# Patient Record
Sex: FEMALE
[De-identification: names, ages, dates, MRNs, and addresses within clinical notes are randomized per-mention and may not be internally consistent; named-entity substitution may affect disease eponyms.]

---

## 2020-05-04 ENCOUNTER — HOSPITAL ENCOUNTER (EMERGENCY)
Dept: HOSPITAL 90 - EDH | Age: 45
Discharge: HOME | End: 2020-05-04
Payer: COMMERCIAL

## 2020-05-04 DIAGNOSIS — F31.9: ICD-10-CM

## 2020-05-04 DIAGNOSIS — F29: Primary | ICD-10-CM

## 2020-05-04 LAB
ALBUMIN SERPL-MCNC: 4.3 G/DL (ref 3.5–5)
ALT SERPL-CCNC: 23 U/L (ref 12–78)
AMPHETAMINES UR QL SCN: NEGATIVE
APAP SERPL-MCNC: < 1 MCG/ML (ref 10–30)
AST SERPL-CCNC: 21 U/L (ref 10–37)
BARBITURATES UR QL SCN: NEGATIVE
BASOPHILS NFR BLD AUTO: 0.9 % (ref 0–5)
BENZODIAZ UR QL SCN: NEGATIVE
BILIRUB SERPL-MCNC: 0.7 MG/DL (ref 0.2–1)
BUN SERPL-MCNC: 18 MG/DL (ref 7–18)
BZE UR QL SCN: NEGATIVE
CANNABINOIDS UR QL SCN: NEGATIVE
CHLORIDE SERPL-SCNC: 102 MMOL/L (ref 101–111)
CO2 SERPL-SCNC: 20 MMOL/L (ref 21–32)
CREAT SERPL-MCNC: 0.8 MG/DL (ref 0.5–1.5)
EOSINOPHIL NFR BLD AUTO: 0.8 % (ref 0–8)
ERYTHROCYTE [DISTWIDTH] IN BLOOD BY AUTOMATED COUNT: 12 % (ref 11–15.5)
ETHANOL SERPL-MCNC: < 3 MG/DL (ref 0–10)
GFR SERPL CREATININE-BSD FRML MDRD: 83 ML/MIN (ref 60–?)
GLUCOSE SERPL-MCNC: 87 MG/DL (ref 70–105)
HCG UR QL: NEGATIVE
HCT VFR BLD AUTO: 37.9 % (ref 36–48)
KETONES UR STRIP-MCNC: 40 MG/DL
LYMPHOCYTES NFR SPEC AUTO: 25.3 % (ref 21–51)
MCH RBC QN AUTO: 29.7 PG (ref 27–33)
MCHC RBC AUTO-ENTMCNC: 33.5 G/DL (ref 32–36)
MCV RBC AUTO: 88.8 FL (ref 79–99)
MONOCYTES NFR BLD AUTO: 6.3 % (ref 3–13)
NEUTROPHILS NFR BLD AUTO: 66.6 % (ref 40–77)
NRBC BLD MANUAL-RTO: 0 % (ref 0–0.19)
OPIATES UR QL SCN: NEGATIVE
PCP UR QL SCN: NEGATIVE
PH UR STRIP: 6 [PH] (ref 5–8)
PLATELET # BLD AUTO: 255 K/UL (ref 130–400)
POTASSIUM SERPL-SCNC: 3 MMOL/L (ref 3.5–5.1)
PROT SERPL-MCNC: 8 G/DL (ref 6–8.3)
RBC # BLD AUTO: 4.27 MIL/UL (ref 4–5.5)
RBC #/AREA URNS HPF: (no result) /HPF (ref 0–1)
SALICYLATES SERPL-MCNC: < 2.8 MG/DL (ref 2.8–20)
SODIUM SERPL-SCNC: 136 MMOL/L (ref 136–145)
SP GR UR STRIP: 1.03 (ref 1–1.03)
UROBILINOGEN UR STRIP-MCNC: 1 MG/DL (ref 0.2–1)
WBC # BLD AUTO: 7.4 K/UL (ref 4.8–10.8)
WBC #/AREA URNS HPF: (no result) /HPF (ref 0–1)

## 2020-05-04 PROCEDURE — 80053 COMPREHEN METABOLIC PANEL: CPT

## 2020-05-04 PROCEDURE — 81025 URINE PREGNANCY TEST: CPT

## 2020-05-04 PROCEDURE — 85025 COMPLETE CBC W/AUTO DIFF WBC: CPT

## 2020-05-04 PROCEDURE — 82550 ASSAY OF CK (CPK): CPT

## 2020-05-04 PROCEDURE — 87077 CULTURE AEROBIC IDENTIFY: CPT

## 2020-05-04 PROCEDURE — 80305 DRUG TEST PRSMV DIR OPT OBS: CPT

## 2020-05-04 PROCEDURE — 93005 ELECTROCARDIOGRAM TRACING: CPT

## 2020-05-04 PROCEDURE — 87186 SC STD MICRODIL/AGAR DIL: CPT

## 2020-05-04 PROCEDURE — 36415 COLL VENOUS BLD VENIPUNCTURE: CPT

## 2020-05-04 PROCEDURE — 99285 EMERGENCY DEPT VISIT HI MDM: CPT

## 2020-05-04 PROCEDURE — 96372 THER/PROPH/DIAG INJ SC/IM: CPT

## 2020-05-04 PROCEDURE — 81001 URINALYSIS AUTO W/SCOPE: CPT

## 2020-05-04 PROCEDURE — 87088 URINE BACTERIA CULTURE: CPT

## 2025-02-09 ENCOUNTER — HOSPITAL ENCOUNTER (EMERGENCY)
Dept: HOSPITAL 90 - EDH | Age: 50
Discharge: LEFT BEFORE DISCHARGE | End: 2025-02-09
Payer: COMMERCIAL

## 2025-02-09 VITALS
RESPIRATION RATE: 20 BRPM | HEART RATE: 128 BPM | DIASTOLIC BLOOD PRESSURE: 89 MMHG | SYSTOLIC BLOOD PRESSURE: 131 MMHG | TEMPERATURE: 98.5 F

## 2025-02-09 VITALS — WEIGHT: 139.99 LBS | HEIGHT: 63 IN | BODY MASS INDEX: 24.8 KG/M2

## 2025-02-09 DIAGNOSIS — Z53.21: ICD-10-CM

## 2025-02-09 DIAGNOSIS — R03.0: Primary | ICD-10-CM

## 2025-02-27 ENCOUNTER — HOSPITAL ENCOUNTER (EMERGENCY)
Dept: HOSPITAL 90 - EDH | Age: 50
Discharge: HOME | End: 2025-02-27
Payer: COMMERCIAL

## 2025-02-27 VITALS — DIASTOLIC BLOOD PRESSURE: 60 MMHG | SYSTOLIC BLOOD PRESSURE: 120 MMHG

## 2025-02-27 VITALS — HEIGHT: 63 IN | BODY MASS INDEX: 23.55 KG/M2 | WEIGHT: 132.94 LBS

## 2025-02-27 VITALS — RESPIRATION RATE: 16 BRPM | TEMPERATURE: 98.3 F | OXYGEN SATURATION: 98 % | HEART RATE: 80 BPM

## 2025-02-27 DIAGNOSIS — S30.0XXA: Primary | ICD-10-CM
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
## 2025-02-27 DIAGNOSIS — Z98.51: ICD-10-CM

## 2025-02-27 LAB
BASOPHILS # BLD AUTO: 0.06 K/UL (ref 0–0.2)
BASOPHILS NFR BLD AUTO: 0.8 % (ref 0–5)
BUN SERPL-MCNC: 8 MG/DL (ref 7–18)
CHLORIDE SERPL-SCNC: 105 MMOL/L (ref 101–111)
CO2 SERPL-SCNC: 31 MMOL/L (ref 21–32)
CREAT SERPL-MCNC: 0.6 MG/DL (ref 0.5–1)
EOSINOPHIL # BLD AUTO: 0.12 K/UL (ref 0–0.7)
EOSINOPHIL NFR BLD AUTO: 1.6 % (ref 0–8)
ERYTHROCYTE [DISTWIDTH] IN BLOOD BY AUTOMATED COUNT: 12.1 % (ref 11–15.5)
GFR SERPL CREATININE-BSD FRML MDRD: 111 ML/MIN (ref 90–?)
GLUCOSE SERPL-MCNC: 93 MG/DL (ref 70–105)
HCT VFR BLD AUTO: 40.1 % (ref 36–48)
IMM GRANULOCYTES # BLD: 0.01 K/UL (ref 0–1)
LYMPHOCYTES # SPEC AUTO: 3 K/UL (ref 1–4.8)
LYMPHOCYTES NFR SPEC AUTO: 40 % (ref 21–51)
MCH RBC QN AUTO: 31.2 PG (ref 27–33)
MCHC RBC AUTO-ENTMCNC: 34.7 G/DL (ref 32–36)
MCV RBC AUTO: 89.9 FL (ref 79–99)
MONOCYTES # BLD AUTO: 0.5 K/UL (ref 0.1–1)
MONOCYTES NFR BLD AUTO: 6.5 % (ref 3–13)
NEUTROPHILS # BLD AUTO: 3.8 K/UL (ref 1.8–7.7)
NEUTROPHILS NFR BLD AUTO: 51 % (ref 40–77)
NRBC BLD MANUAL-RTO: 0 % (ref 0–0.19)
PLATELET # BLD AUTO: 262 K/UL (ref 130–400)
POTASSIUM SERPL-SCNC: 3.9 MMOL/L (ref 3.5–5.1)
RBC # BLD AUTO: 4.46 MIL/UL (ref 4–5.5)
SODIUM SERPL-SCNC: 139 MMOL/L (ref 136–145)
WBC # BLD AUTO: 7.5 K/UL (ref 4.8–10.8)

## 2025-02-27 PROCEDURE — 99284 EMERGENCY DEPT VISIT MOD MDM: CPT

## 2025-02-27 PROCEDURE — 84484 ASSAY OF TROPONIN QUANT: CPT

## 2025-02-27 PROCEDURE — 72070 X-RAY EXAM THORAC SPINE 2VWS: CPT

## 2025-02-27 PROCEDURE — 93005 ELECTROCARDIOGRAM TRACING: CPT

## 2025-02-27 PROCEDURE — 84703 CHORIONIC GONADOTROPIN ASSAY: CPT

## 2025-02-27 PROCEDURE — 73030 X-RAY EXAM OF SHOULDER: CPT

## 2025-02-27 PROCEDURE — 72170 X-RAY EXAM OF PELVIS: CPT

## 2025-02-27 PROCEDURE — 80048 BASIC METABOLIC PNL TOTAL CA: CPT

## 2025-02-27 PROCEDURE — 85025 COMPLETE CBC W/AUTO DIFF WBC: CPT

## 2025-02-27 PROCEDURE — 36415 COLL VENOUS BLD VENIPUNCTURE: CPT

## 2025-02-27 NOTE — EKG
[facility]

                                       

Test Date:    2025               Test Time:    16:30:13

Pat Name:     DAVID BOGGS        Department:   [facility]

Patient ID:   [id number]           Room:          

Gender:       F                        Technician:   [id number]

:          1975               Requested By: CHANTELLE TSAI

Order Number: [id number]        Reading MD:   Shruthi Wang

                                 Measurements

Intervals                              Axis          

Rate:         74                       P:            51

TN:           165                      QRS:          -27

QRSD:         82                       T:            16

QT:           356                                    

QTc:          395                                    

                           Interpretive Statements

Sinus rhythm

Compared to ECG 2020 16:32:16

No significant changes

Electronically Signed On 2025 08:39:49 CST by Shruthi Wang



Please click the below link to view image of tracing.

## 2025-02-27 NOTE — HMCIMG
THORACIC SPINE 2VWS



CLINICAL HISTORY: Left lateral T-spine pain status post fall sat



COMPARISON: None.



TECHNIQUE: AP and lateral images were obtained.



FINDINGS:

There are normal appearing vertebral bodies. Interspace heights are well

preserved. There are no visible fractures. Soft tissues appear

unremarkable.



IMPRESSION:

Normal views of the thoracic spine.

## 2025-02-27 NOTE — HMCIMG
SHOULDER COMP 2+VWS LT



CLINICAL HISTORY: Left posterior shoulder pain status post fall Saturday





COMPARISON: None 



TECHNIQUE: 3 images were obtained.



FINDINGS: No obvious fracture or dislocation. No joint effusion. The

soft tissues appear unremarkable. No radiopaque foreign bodies.



IMPRESSION: No acute findings.

## 2025-02-27 NOTE — HMCIMG
PELVIS 1-2VWS



REASON:  Bilateral posterior pelvic pain status post fall Saturday



TECHNIQUE: Claudia view was obtained.



FINDINGS:

Bones appear normal. There are no visible fractures. Joint spaces are

unremarkable. Soft tissues appear normal as well.





IMPRESSION:

1. Negative AP pelvis.

## 2025-02-27 NOTE — ERN
ED Note


History of Present Illness


Stated Complaint:  MECHANICAL FALL


Time Seen by MD:  16:15


Dictation:


Patient is a 49-year-old female coming in today with a long history of being 

admitted to [facility] this weekend and she was in a wet bathroom it is 

the same the a same level fall.  She states she landed on her back and left 

posterior shoulder.  She states she had told the staff that she had fallen 

however they were busy with the another patient who was more psychiatric 

emergent then she was and could not tend to her at that time.   She denies LOC 

no nausea vomiting no blood thinners states she does history of orthostasis and 

is dizzy apparently she is complaining of posterior pelvic pain more to the 

right, left posterior thoracic left posterior shoulder pain.  She states she was

discharged on Monday however did not think to go and be seen until today when 

she went to [facility] and was told to come to the emergency room.


Allergies:  


Coded Allergies:  


     No Known Drug Allergies (Unverified  Allergy, Unknown, 2/27/25)


Home Meds


Active Scripts


Ibuprofen (Ibuprofen) 600 Mg Tablet, 600 MG PO Q6H PRN for PAIN, #30 TAB


   Prov:CHANTELLE TSAI NP         2/27/25





Past Medical History


Past Medical History:  Other


Additional Past Medical Hx:  RAYNAUDS


Surgical History:  BTL


Pregnancy History:  Not Applicable


RN Note Reviewed/Agreed w/PFSH:  Yes





Review of System


Dictation


CONSTITUTIONAL:  Negative except for HPI


HEAD/FACE:  Negative except for HPI


EENT:  Negative except for HPI


RESPIRATORY: Negative except for HPI


GASTROINTESTINAL/ABDOMINAL:   Negative except for HPI


GENITOURINARY: Negative except for HPI


MUSCULOSKELETAL: Negative except for HPI right posterior pelvis lumbar pain, 

left posterior shoulder and left lateral thoracic pain.


INTEGUMENTARY:  Negative except for HPI


NEUROLOGICAL/PSYCH: Negative except for HPI dizziness


HEMATOLOGIC/LYMPHATIC:  Negative except for HPI





All Systems Negative, Except as noted above.





13 point review of systems assessed and all negative except for above.





Initial Vital Sign


VS





                                   Vital Signs








  Date Time  Temp Pulse Resp B/P (MAP) Pulse Ox O2 Delivery O2 Flow Rate FiO2


 


2/27/25 16:35 97.9 85 16 121/61 97 Room Air  


 


2/27/25 16:49       0 21











Physical Exam


Dictation


Vital Signs reviewed 


General Appearance: Alert, oriented x 3, mild acute distress, well developed, 

nourished. 


Head and Face: non-traumatic.


Eyes: PERRL, pink conjunctivas, eyelid no trauma, anterior chamber with arcus 

senilis. 


Ears: Pinnas intact and no signs of trauma or erythema ear canals clear and no 

discharge TM no erythema 


Nose: No discharge, no bleeding. 


Oropharynx: Mouth normal, tongue pink, 


pharynx clear,no erythema, tonsils no exudates, no abscesses noted,  mucous 

membrane moist 


Neck: Supple, non-tender, no thyromegaly, no masses, no JVD, no bruits no 

midline spine pain


Breast:Deferred 


Chest:No tenderness, no crepitus, no paradoxical movement, no retractions 


Lungs:Clear, well-ventilated, symmetric, no rales, no wheezing, no rhonchi, no 

stridor, good breath sounds bilaterally 


Heart: Regular rate, regular rhythm, no murmur, no gallops 


Vascular: no peripheral edema, 


Abdomen: Soft, positive bowel sounds, nondistended, no guarding, 


nontender, no rebound, no masses no hepatomegaly, no splenomegaly, no Stubbs's 

sign, no hernias.


Rectal: Deferred


Genital: Deferred


Neurological: Normal speech,  motor function intact, sensory function intact 

neurologically intact, speech is clear NIH is 0


Musculoskeletal: Neck nontender, right posterior pelvis and low lumbar 

tenderness.  Also mild tenderness to the posterior left shoulder and left 

lateral thoracic area.


Extremities: nontender, full range of motion 


Skin: Color pink, dry, no turgor, no rash, no lacerations, no abrasions, no 

contusions.


Lymphatic: Deferred





Results (Laboratory/Radiology)


Laboratory/Radiology





Laboratory Tests








Test


 2/27/25


16:47


 


White Blood Count


 7.5 K/uL


(4.8-10.8)


 


Red Blood Count


 4.46 MIL/uL


(4.00-5.50)


 


Hemoglobin


 13.9 g/dL


(12.0-16.0)


 


Hematocrit


 40.1 % (36-48)





 


Mean Corpuscular Volume


 89.9 fL


(79-99)


 


Mean Corpuscular Hemoglobin


 31.2 pg


(27.0-33.0)


 


Mean Corpuscular Hemoglobin


Concent 34.7 g/dL


(32.0-36.0)


 


Red Cell Distribution Width


 12.1 %


(11.0-15.5)


 


Platelet Count


 262 K/uL


(130-400)


 


Mean Platelet Volume


 8.2 fL


(7.5-10.5)


 


Immature Granulocyte % (Auto) 0.1 % (0-1)  


 


Neutrophils (%) (Auto)


 51.0 %


(40.0-77.0)


 


Lymphocytes (%) (Auto)


 40.0 %


(21.0-51.0)


 


Monocytes (%) (Auto)


 6.5 %


(3.0-13.0)


 


Eosinophils (%) (Auto)


 1.6 %


(0.0-8.0)


 


Basophils (%) (Auto)


 0.8 %


(0.0-5.0)


 


Neutrophils # (Auto)


 3.8 K/uL


(1.8-7.7)


 


Lymphocytes # (Auto)


 3.0 K/uL


(1.0-4.8)


 


Monocytes # (Auto)


 0.5 K/uL


(0.1-1.0)


 


Eosinophils # (Auto)


 0.12 K/uL


(0.00-0.70)


 


Basophils # (Auto)


 0.06 K/uL


(0.00-0.20)


 


Absolute Immature Granulocyte


(auto 0.01 K/uL


(0-1)


 


Nucleated Red Blood Cells


 0.0 %


(0.0-0.19)


 


Sodium Level


 139 mmol/L


(136-145)


 


Potassium Level


 3.9 mmol/L


(3.5-5.1)


 


Chloride Level


 105 mmol/L


(101-111)


 


Carbon Dioxide Level


 31 mmol/L


(21-32)


 


Blood Urea Nitrogen


 8 mg/dL (7-18)





 


Creatinine


 0.6 mg/dL


(0.5-1.0)


 


Glomerular Filtration Rate


Calc 111 mL/min


(>90)


 


Random Glucose


 93 mg/dL


()


 


Total Calcium


 8.8 mg/dL


(8.5-10.1)


 


Troponin I High Sensitivity 4 ng/L (4-50)  


 


Serum Pregnancy Test,


Qualitative NEGATIVE


(NEGATIVE)








Labs Reviewed?:  Yes


EKG Comment:


EKG normal sinus rhythm/heart rate 74/axis normal/no ectopy





ED Course


ED Course





Orders








Procedure Category Date Status





  Time 


 


Pelvis 1-2vws RAD 2/27/25 Resulted





  16:24 


 


Shoulder Comp 2+Vws Lt RAD 2/27/25 Resulted





  16:24 


 


Thoracic Spine 2vws RAD 2/27/25 Resulted





  16:24 


 


Cbc With Differential LAB 2/27/25 Complete





  16:24 


 


Troponin I High LAB 2/27/25 Complete





Sensitivity  16:24 


 


12 Lead Ekg Tracing- EKG 2/27/25 Complete





Technical  16:24 


 


Basic Metabolic Panel LAB 2/27/25 Complete





  16:24 


 


Acetaminophen 500mg PHA 2/27/25 Complete





Tab (Tylenol 500mg T  16:30 


 


Pregnancy Testing, LAB 2/27/25 Complete





Serum Hcg  17:01 








Current Medications








 Medications


  (Trade)  Dose


 Ordered  Sig/Greg


 Route


 PRN Reason  Start Time


 Stop Time Status Last Admin


Dose Admin


 


 Acetaminophen


  (TYLenol 500MG


 TAB)  1,000 mg  ONCE  ONCE


 PO


   2/27/25 16:30


 2/27/25 16:40 DC 2/27/25 17:42











Vital Signs








  Date Time  Temp Pulse Resp B/P (MAP) Pulse Ox O2 Delivery O2 Flow Rate FiO2


 


2/27/25 19:41 98.2 80 16  98 Room Air* 0 21


 


2/27/25 16:49 98.1 82 16 120/60 98 Room Air* 0 21


 


2/27/25 16:35 97.9 85 16 121/61 97 Room Air  











1920/pelvic x-ray negative


Left shoulder x-ray negative


Thoracic x-ray negative








1925/patient is hemodynamically stable and neurologically intact.  She will be 

discharged home with multiple contusions and told to follow up with the doctor 

at [facility] for any further complaints.











HEART Score Response (Comments) Value


 


History: Low suspicion (0) 0


 


Age:                                    45-65yrs (+1) 1


 


Risk Factors: No known risk factors (0) 0


 


Initial Troponin: Normal limit (0) 0


 


Total  1











Medical Decision Making


MDM


MDM: 


Differential diagnosis:  ACS/AMI/electrolyte imbalance/dehydration/contusion/fra

cture


Rationale: Tests considered and ordered secondary to shared decision making 

include:  Radiology/labs/EKG


Previous outside records reviewed: Old ER visits.


Risk of complication and/or morbidity or mortality of patient management: None


Medications-Per medication reconciliation none


Need for hospitalization: Patient does not meet criteria for hospitalization.  

No


Need for emergency major/minor surgery: No





There are no social concerns with this patient.





Prescription drug management ibuprofen


Prescriptions will include symptomatic care





Patient's prior external medical records from other ER visits were reviewed by 

me as indicated.  Prior testing and results from previous visits were reviewed. 

Prior tests were taken into account with medical decision making and resource 

utilization, independent historian/historians were used to obtain complete 

medical history.





I independently interpreted the test that were performed, results were reviewed 

by me and considered findings on radiology if ordered.





Medical management and examination interpretation discussions were had by me 

with other qualified healthcare professionals as indicated for the patient's 

care.





DX & DISP


Disposition:  Discharge


Departure


Impression:  


   Primary Impression:  Multiple contusions of trunk


   Additional Impression:  Fall


Condition:  Stable


Scripts


Ibuprofen (Ibuprofen) 600 Mg Tablet


600 MG PO Q6H PRN for PAIN, #30 TAB


   Prov: CHANTELLE TSAI NP         2/27/25





Additional Instructions:  


Follow-up with primary care provider in 1 to 2 days.  Take medications as 

directed here in the emergency room.  Okay to continue home medications unless 

otherwise discussed during your visit in the emergency room today.  Return to 

your nearest emergency room if symptoms worsen or if there is no improvement.  

Call 911 if you need immediate assistance.  Take Tylenol or Motrin 

over-the-counter as needed and if no contraindications are present.  Increase 

oral hydration.  A wound culture or urine culture was ordered here in the em

ergency room department please follow-up with primary care provider and advise 

them to get repeat ports from our facility.  If you had any Ace wrap/splints 

that were applied here, please do not remove them until you see your primary 

care or specialty.





Diet and activity as tolerated.  Take ibuprofen as needed with food for pain.  

Follow back up with your doctor at [facility] for management


Referrals:  


DON MORRIS (PCP)


Time of Disposition:  19:27


I have reviewed the case, and I agree with, Diagnosis and Plan











CHANTELLE TSAI NP              Feb 27, 2025 16:29


ANNEMARIE MEZA DO                Feb 28, 2025 12:13

## 2025-03-01 ENCOUNTER — HOSPITAL ENCOUNTER (EMERGENCY)
Dept: HOSPITAL 90 - EDH | Age: 50
Discharge: HOME | End: 2025-03-01
Payer: COMMERCIAL

## 2025-03-01 VITALS — TEMPERATURE: 98.1 F

## 2025-03-01 VITALS
DIASTOLIC BLOOD PRESSURE: 67 MMHG | HEART RATE: 70 BPM | OXYGEN SATURATION: 96 % | SYSTOLIC BLOOD PRESSURE: 112 MMHG | RESPIRATION RATE: 18 BRPM

## 2025-03-01 VITALS — HEIGHT: 63 IN | WEIGHT: 136.03 LBS | BODY MASS INDEX: 24.1 KG/M2

## 2025-03-01 DIAGNOSIS — Z79.899: ICD-10-CM

## 2025-03-01 DIAGNOSIS — Z98.51: ICD-10-CM

## 2025-03-01 DIAGNOSIS — G44.229: Primary | ICD-10-CM

## 2025-03-01 LAB
APPEARANCE UR: CLEAR
BASOPHILS # BLD AUTO: 0.02 K/UL (ref 0–0.2)
BASOPHILS NFR BLD AUTO: 0.5 % (ref 0–5)
BILIRUB UR QL STRIP: NEGATIVE MG/DL
BUN SERPL-MCNC: 14 MG/DL (ref 7–18)
CHLORIDE SERPL-SCNC: 104 MMOL/L (ref 101–111)
CO2 SERPL-SCNC: 26 MMOL/L (ref 21–32)
COLOR UR: COLORLESS
CREAT SERPL-MCNC: 0.7 MG/DL (ref 0.5–1)
DEPRECATED SQUAMOUS URNS QL MICRO: (no result) /HPF (ref 0–2)
EOSINOPHIL # BLD AUTO: 0.01 K/UL (ref 0–0.7)
EOSINOPHIL NFR BLD AUTO: 0.2 % (ref 0–8)
ERYTHROCYTE [DISTWIDTH] IN BLOOD BY AUTOMATED COUNT: 12 % (ref 11–15.5)
GFR SERPL CREATININE-BSD FRML MDRD: 107 ML/MIN (ref 90–?)
GLUCOSE SERPL-MCNC: 160 MG/DL (ref 70–105)
GLUCOSE UR STRIP-MCNC: NEGATIVE MG/DL
HCG UR QL: NEGATIVE
HCT VFR BLD AUTO: 38.2 % (ref 36–48)
HGB UR QL STRIP: NEGATIVE
IMM GRANULOCYTES # BLD: 0.02 K/UL (ref 0–1)
KETONES UR STRIP-MCNC: NEGATIVE MG/DL
LEUKOCYTE ESTERASE UR QL STRIP: NEGATIVE LEU/UL
LYMPHOCYTES # SPEC AUTO: 1.1 K/UL (ref 1–4.8)
LYMPHOCYTES NFR SPEC AUTO: 26.1 % (ref 21–51)
MCH RBC QN AUTO: 31.2 PG (ref 27–33)
MCHC RBC AUTO-ENTMCNC: 35.1 G/DL (ref 32–36)
MCV RBC AUTO: 88.8 FL (ref 79–99)
MICRO URNS: YES
MONOCYTES # BLD AUTO: 0.1 K/UL (ref 0.1–1)
MONOCYTES NFR BLD AUTO: 1.4 % (ref 3–13)
MUCOUS THREADS URNS QL MICRO: (no result) LPF
NEUTROPHILS # BLD AUTO: 3 K/UL (ref 1.8–7.7)
NEUTROPHILS NFR BLD AUTO: 71.3 % (ref 40–77)
NITRITE UR QL STRIP: NEGATIVE
NRBC BLD MANUAL-RTO: 0 % (ref 0–0.19)
PH UR STRIP: 7 [PH] (ref 5–8)
PLATELET # BLD AUTO: 250 K/UL (ref 130–400)
POTASSIUM SERPL-SCNC: 4.1 MMOL/L (ref 3.5–5.1)
PROT UR QL STRIP: NEGATIVE MG/DL
RBC # BLD AUTO: 4.3 MIL/UL (ref 4–5.5)
SODIUM SERPL-SCNC: 138 MMOL/L (ref 136–145)
SP GR UR STRIP: 1 (ref 1–1.03)
UROBILINOGEN UR STRIP-MCNC: 0.2 MG/DL (ref 0.2–1)
WBC # BLD AUTO: 4.2 K/UL (ref 4.8–10.8)
WBC #/AREA URNS HPF: (no result) /HPF (ref 0–1)

## 2025-03-01 PROCEDURE — 80048 BASIC METABOLIC PNL TOTAL CA: CPT

## 2025-03-01 PROCEDURE — 81001 URINALYSIS AUTO W/SCOPE: CPT

## 2025-03-01 PROCEDURE — 85025 COMPLETE CBC W/AUTO DIFF WBC: CPT

## 2025-03-01 PROCEDURE — 36415 COLL VENOUS BLD VENIPUNCTURE: CPT

## 2025-03-01 PROCEDURE — 81025 URINE PREGNANCY TEST: CPT

## 2025-03-01 PROCEDURE — 99284 EMERGENCY DEPT VISIT MOD MDM: CPT

## 2025-03-01 PROCEDURE — 96361 HYDRATE IV INFUSION ADD-ON: CPT

## 2025-03-01 PROCEDURE — 96374 THER/PROPH/DIAG INJ IV PUSH: CPT

## 2025-03-01 PROCEDURE — 96375 TX/PRO/DX INJ NEW DRUG ADDON: CPT

## 2025-03-01 RX ADMIN — SODIUM CHLORIDE ONE MLS/HR: 0.9 INJECTION, SOLUTION INTRAVENOUS at 06:01

## 2025-03-01 RX ADMIN — PROCHLORPERAZINE EDISYLATE ONE MG: 5 INJECTION INTRAMUSCULAR; INTRAVENOUS at 06:01

## 2025-03-01 NOTE — ERN
General


Chief Complaint:  Headache


Stated Complaint:  HEADACHE


Time Seen by MD:  04:56


Source:  patient





History of Present Illness


Initial Comments


PATIENT IS A 49-YEAR-OLD FEMALE COMING IN TO BE EVALUATED FOR HEADACHE AND 

DIZZINESS.  ALONG WITH THE HEADACHE AND DIZZINESS SHE STATES THAT SHE HAS BEEN 

UNDER LOT OF STRESS AND SHE HAS UPPER BACK PAIN.  PATIENT HAS BEEN EVALUATED IN 

THE PAST FOR UPPER BACK PAIN WITH A IMAGING STUDIES WHICH HAS BEEN NEGATIVE.  

SHE STATES IT WAS ALL STEMMED FROM WITH A SEVEN IN WHICH STATES THAT SHE HAD AN 

ARGUMENT WITH A HAS BEEN DUE TO HIM BEING IN FACIAL.  SINCE THEN SHE HAS BEEN 

UNDER LOT OF STRESS SHE STATES.


Allergies:  


Coded Allergies:  


     No Known Drug Allergies (Unverified  Allergy, Unknown, 2/27/25)


Home Meds


Active Scripts


Ibuprofen (Ibuprofen) 600 Mg Tablet, 600 MG PO Q6H PRN for PAIN, #30 TAB


   Prov:EULALIOCHANTELLE NP         2/27/25





Past Medical History


Past Medical History:  Other


Medical History Other:  RAYNAUDS


Past Surgical History:  BTL





Female(pregnancy History)


Pregnancy History:  Not Applicable





ROS Dictation


CONSTITUTIONAL:  NO CHILLS, NO FEVER, NO WEAKNESS, NO DIAPHORESIS, NO MALAISE.


HEAD/FACE:  NO SIGNS OF TRAUMA. 


EENT:  NO EYE PAIN, NO BLURRED VISION, NO TEARING, NO DOUBLE VISION, NO EAR 

PAIN, NO EAR DISCHARGE, NO NOSE PAIN, NO NASAL CONGESTION, NO THROAT PAIN, NO 

THROAT SWELLING, NO MOUTH PAIN. 


RESPIRATORY:  NO COUGH, NO ORTHOPNEA, NO SOB, NO STRIDOR, NO WHEEZING. 


CARDIOVASCULAR:  NO CHEST PAIN, NO EDEMA, NO PALPITATIONS, NO SYNCOPE. 


GASTROINTESTINAL/ABDOMINAL:   NO ABDOMINAL PAIN, NO CONSTIPATION, NO DIARRHEA, 

NO NAUSEA, NO VOMITING. 


GENITOURINARY:  NO ABNORMAL DISCHARGE, NO DYSURIA, NO FREQUENT URINATION, NO 

HEMATURIA. NO COMPLAINTS OF PAIN IN THE GENITALS. 


MUSCULOSKELETAL:  NO BACK PAIN, NO GOUT, NO JOINT PAIN, NO JOINT SWELLING, NO 

MUSCLE PAIN, NO MUSCLE STIFFNESS, NO NECK PAIN. 


INTEGUMENTARY:  NO CHANGE IN COLOR, NO CHANGE IN HAIR/NAILS, NO DRYNESS, NO 

LESION, NO LUMPS, NO RASH. 


NEUROLOGICAL/PSYCH:  NO ANXIETY, NOT DEPRESSED, NO EMOTIONAL PROBLEM, NO 

HEADACHE, NO NUMBNESS, NO PRE-EXISTING DEFICIT, NO HISTORY OF SEIZURES,  NO 

TREMORS, NO WEAKNESS. 


HEMATOLOGIC/LYMPHATIC:  NOT ANEMIC, NO HISTORY OF BLOOD CLOTS, NO APPARENT 

BLEEDING, NO BRUISING, GLANDS NOT SWOLLEN.


ALL SYSTEMS NEGATIVE, EXCEPT AS NOTED.





Physical Exam


Physical Exam Dictation


VITAL SIGNS:  REVIEWED. 


GENERAL APPEARANCE:  ALERT, ORIENTED X3, NO ACUTE DISTRESS, OBESE.


HEAD AND FACE: NON-TRAUMATIC. 


EYES:   PERRL, PINK CONJUNCTIVAS, EYELID NO TRAUMA, ANTERIOR CHAMBER CLEAR. 


EARS:  PINNAS INTACT AND NO SIGNS OF TRAUMA OR ERYTHEMA.  EAR CANALS CLEAR AND 

NO DISCHARGE. TMS NO ERYTHEMA. 


NOSE:  NO DISCHARGE, NO BLEEDING. 


OROPHARYNX:  MOUTH NORMAL, TEETH NO CARIES, TONGUE PINK.  PHARYNX CLEAR, NO 

ERYTHEMA.  TONSILS NO EXUDATES, NO ABSCESSES NOTED. MUCOUS MEMBRANE MOIST.


NECK:  SUPPLE, NON-TENDER, NO THYROMEGALY, NO MASSES, NO JVD, NO BRUITS. 


BREAST:  DEFERRED.


CHEST:   NO TENDERNESS, NO CREPITUS, NO PARADOXICAL MOVEMENT, NO RETRACTIONS.


LUNGS:  CLEAR, WELL-VENTILATED, SYMMETRIC, NO RALES, NO WHEEZING, NO RHONCHI, NO

STRIDOR, GOOD BREATH SOUNDS BILATERALLY. 


HEART:  REGULAR RATE, REGULAR RHYTHM, NO MURMUR, NO GALLOPS. 


VASCULAR: NO PERIPHERAL EDEMA.


ABDOMEN: SOFT, POSITIVE BOWEL SOUNDS, NONDISTENDED, NO GUARDING, NONTENDER, NO 

REBOUND, NO MASSES NO HEPATOMEGALY, NO SPLENOMEGALY, NO VILLEGAS'S SIGN, NO 

HERNIAS. 


RECTAL: DEFERRED. 


GENITAL:  DEFERRED. 


NEUROLOGICAL:  NORMAL SPEECH, GROSS MOTOR FUNCTION INTACT, GROSS SENSORY 

FUNCTION INTACT.


MUSCULOSKELETAL:  NECK NONTENDER, FULL RANGE OF MOTION, BACK NONTENDER, FULL 

RANGE OF MOTION.


EXTREMITIES: NONTENDER, FULL RANGE OF MOTION. 


SKIN:  COLOR PINK, DRY, NO TURGOR, NO RASH, NO LACERATIONS, NO ABRASIONS, NO 

CONTUSIONS.


LYMPHATICS:  DEFERRED.





Results


Laboratory and Microbiology


Lab and Micro Result





Laboratory Tests








Test


 3/1/25


04:58 3/1/25


05:43


 


Urine Color


 COLORLESS


(YELLOW) 





 


Urine Appearance CLEAR (CLEAR)   


 


Urine pH 7.0 (5.0-8.0)   


 


Urine Specific Gravity


 1.005


(1.001-1.031) 





 


Urine Protein


 NEGATIVE mg/dL


(NEGATIVE) 





 


Urine Glucose (UA)


 NEGATIVE mg/dL


(NEGATIVE) 





 


Urine Ketones


 NEGATIVE mg/dL


(NEGATIVE) 





 


Urine Occult Blood


 NEGATIVE


(NEGATIVE) 





 


Urine Nitrate


 NEGATIVE


(NEGATIVE) 





 


Urine Bilirubin


 NEGATIVE mg/dL


(NEGATIVE) 





 


Urine Urobilinogen


 0.2 mg/dL


(0.2-1.0) 





 


Urine Leukocyte Esterase


 NEGATIVE


Cecelia/uL 





 


Urine RBC


 None /HPF


(0-1) 





 


Urine WBC


 0-1 /HPF (0-1)


 





 


Urine Squamous Epithelial


Cells RARE /HPF


(0-2) 





 


Urine Bacteria


 None /HPF


(None Seen) 





 


Urine HCG, Qualitative


 NEGATIVE


(NEGATIVE) 





 


White Blood Count


 


 4.2 K/uL


(4.8-10.8)  L


 


Red Blood Count


 


 4.30 MIL/uL


(4.00-5.50)


 


Hemoglobin


 


 13.4 g/dL


(12.0-16.0)


 


Hematocrit


 


 38.2 % (36-48)





 


Mean Corpuscular Volume


 


 88.8 fL


(79-99)


 


Mean Corpuscular Hemoglobin


 


 31.2 pg


(27.0-33.0)


 


Mean Corpuscular Hemoglobin


Concent 


 35.1 g/dL


(32.0-36.0)


 


Red Cell Distribution Width


 


 12.0 %


(11.0-15.5)


 


Platelet Count


 


 250 K/uL


(130-400)


 


Mean Platelet Volume


 


 8.1 fL


(7.5-10.5)


 


Immature Granulocyte % (Auto)  0.5 % (0-1)  


 


Neutrophils (%) (Auto)


 


 71.3 %


(40.0-77.0)


 


Lymphocytes (%) (Auto)


 


 26.1 %


(21.0-51.0)


 


Monocytes (%) (Auto)


 


 1.4 %


(3.0-13.0)  L


 


Eosinophils (%) (Auto)


 


 0.2 %


(0.0-8.0)


 


Basophils (%) (Auto)


 


 0.5 %


(0.0-5.0)


 


Neutrophils # (Auto)


 


 3.0 K/uL


(1.8-7.7)


 


Lymphocytes # (Auto)


 


 1.1 K/uL


(1.0-4.8)


 


Monocytes # (Auto)


 


 0.1 K/uL


(0.1-1.0)


 


Eosinophils # (Auto)


 


 0.01 K/uL


(0.00-0.70)


 


Basophils # (Auto)


 


 0.02 K/uL


(0.00-0.20)


 


Absolute Immature Granulocyte


(auto 


 0.02 K/uL


(0-1)


 


Nucleated Red Blood Cells


 


 0.0 %


(0.0-0.19)


 


Sodium Level


 


 138 mmol/L


(136-145)


 


Potassium Level


 


 4.1 mmol/L


(3.5-5.1)


 


Chloride Level


 


 104 mmol/L


(101-111)


 


Carbon Dioxide Level


 


 26 mmol/L


(21-32)


 


Blood Urea Nitrogen


 


 14 mg/dL


(7-18)


 


Creatinine


 


 0.7 mg/dL


(0.5-1.0)


 


Glomerular Filtration Rate


Calc 


 107 mL/min


(>90)


 


Random Glucose


 


 160 mg/dL


()  H


 


Total Calcium


 


 8.9 mg/dL


(8.5-10.1)








Labs Reviewed?:  Yes





MDM


MDM: 


DIFFERENTIAL DIAGNOSIS:  TENSION HEADACHE, STRESS, MUSCLE STRAIN


PATIENT IS A 49-YEAR-OLD FEMALE COMING IN TO BE EVALUATED FOR TRAPEZIUS MUSCLE 

TENDERNESS CAUSING HEADACHES.  SHE STATES THAT SHE HAS BEEN UNDER LOT OF STRESS 

DUE TO FAMILY ISSUES.  THROUGHOUT ER VISIT PATIENT HAS BEEN STABLE SHE RECEIVED 

A MIGRAINE COCKTAIL STATES HE FEELS MUCH BETTER.  WE WILL BE DISCHARGED IN 

STABLE CONDITION I DID ADVISED HER APPROPRIATE FOLLOW UP WITH PCP FOR LONG-TERM 

MANAGEMENT OF TENSION HEADACHES.


ED Course





Orders








Procedure Category Date Status





  Time 


 


Urinalysis Profile LAB 3/1/25 Complete





  04:59 


 


Pregnancy,Urine Test LAB 3/1/25 Complete





  04:59 


 


Cbc With Differential LAB 3/1/25 Complete





  05:01 


 


Basic Metabolic Panel LAB 3/1/25 Complete





  05:01 


 


0.9%Nacl 1000ml (Ns PHA 3/1/25 Complete





1000ml)  05:30 


 


Prochlorperazine PHA 3/1/25 Complete





10mg/2ml Inj  05:30 


 


Diphenhydramine Hcl PHA 3/1/25 Complete





 (Benadryl Inj)  05:30 


 


Acetaminophen 500mg PHA 3/1/25 Complete





Tab (Tylenol 500mg T  05:30 








Current Medications








 Medications


  (Trade)  Dose


 Ordered  Sig/Greg


 Route


 PRN Reason  Start Time


 Stop Time Status Last Admin


Dose Admin


 


 Acetaminophen


  (TYLenol 500MG


 TAB)  500 mg  ONCE  ONCE


 PO


   3/1/25 05:30


 3/1/25 05:31 DC 3/1/25 06:02





 


 Diphenhydramine


 HCl


  (BENAdryl INJ)  25 mg  ONCE  ONCE


 IV


   3/1/25 05:30


 3/1/25 05:31 DC 3/1/25 06:01





 


 Prochlorperazine


 Edisylate


  (Compazine 10mg/


 2ml Inj)  10 mg  ONCE  ONCE


 IV


   3/1/25 05:30


 3/1/25 05:31 DC 3/1/25 06:01





 


 Sodium Chloride  1,000 ml @ 


 0 mls/hr  ONCE  ONCE


 IV


   3/1/25 05:30


 3/1/25 05:31 DC 3/1/25 06:01











Vital Signs








  Date Time  Temp Pulse Resp B/P (MAP) Pulse Ox O2 Delivery O2 Flow Rate FiO2


 


3/1/25 06:10  70 18 112/67 96 Room Air* 0 21


 


3/1/25 05:00 98.1 67 18 104/45 97 Room Air 0 











DX & DISP


Disposition:  Discharge


Departure


Impression:  


   Primary Impression:  Tension headache, chronic


Condition:  Stable


Scripts


Diclofenac Sodium (Voltaren Arthritis Pain) 1 % Gel..gram.


4 GM TP BID for 10 Days, #1 TUBE


   Prov: ARNALDO RODRIGUES MD         3/1/25





Additional Instructions:  


FOLLOW-UP WITH PRIMARY CARE PROVIDER IN 1 TO 2 DAYS.  TAKE MEDICATIONS AS 

DIRECTED HERE IN THE EMERGENCY ROOM.  OKAY TO CONTINUE HOME MEDICATIONS UNLESS 

OTHERWISE DISCUSSED DURING YOUR VISIT IN THE EMERGENCY ROOM TODAY.  RETURN TO 

YOUR NEAREST EMERGENCY ROOM IF SYMPTOMS WORSEN OR IF THERE IS NO IMPROVEMENT.  

CALL 911 IF YOU NEED IMMEDIATE ASSISTANCE.  TAKE TYLENOL  OVER-THE-COUNTER AS 

NEEDED AND IF NO CONTRAINDICATIONS ARE PRESENT.  INCREASE ORAL HYDRATION.  A 

WOUND CULTURE OR URINE CULTURE WAS ORDERED HERE IN THE EMERGENCY ROOM DEPARTMENT

PLEASE FOLLOW-UP WITH PRIMARY CARE PROVIDER AND ADVISE THEM TO GET REPEAT PORTS 

FROM OUR FACILITY.  IF YOU HAD ANY ACE WRAP/SPLINTS THAT WERE APPLIED HERE, PL

EASE DO NOT REMOVE THEM UNTIL YOU SEE YOUR PRIMARY CARE OR SPECIALTY.





REFERRALS:


Referrals:  


DON MORRIS (PCP)


Time of Disposition:  06:16











ARNALDO RODRIGUES MD               Mar 1, 2025 05:05

## 2025-03-26 ENCOUNTER — HOSPITAL ENCOUNTER (EMERGENCY)
Dept: HOSPITAL 90 - EDH | Age: 50
LOS: 1 days | Discharge: HOME | End: 2025-03-27
Payer: COMMERCIAL

## 2025-03-26 VITALS — BODY MASS INDEX: 24.8 KG/M2 | WEIGHT: 139.99 LBS | HEIGHT: 63 IN
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
## 2025-03-26 DIAGNOSIS — M51.16: Primary | ICD-10-CM
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
## 2025-03-26 DIAGNOSIS — Z98.51: ICD-10-CM

## 2025-03-26 DIAGNOSIS — Z79.1: ICD-10-CM
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
## 2025-03-26 PROCEDURE — 99285 EMERGENCY DEPT VISIT HI MDM: CPT

## 2025-03-26 PROCEDURE — 70450 CT HEAD/BRAIN W/O DYE: CPT

## 2025-03-26 PROCEDURE — 96361 HYDRATE IV INFUSION ADD-ON: CPT

## 2025-03-26 PROCEDURE — 74176 CT ABD & PELVIS W/O CONTRAST: CPT

## 2025-03-26 PROCEDURE — 96374 THER/PROPH/DIAG INJ IV PUSH: CPT

## 2025-03-26 NOTE — ERN
General


Chief Complaint:  Mechanical Fall


Stated Complaint:  C/O PAIN TO LT LEG, BACK PAIN AFTER FALL TODAY


Time Seen by MD:  22:12





History of Present Illness


Initial Comments


49-year-old female with a ground level fall in the parking lot she has pain in 

her low back that radiates down her left leg to her to her toe she has anterior 

lower abdominal pain and then also has some pain in her he had hands and hips.  

Approximately a week ago she had a spontaneous intracerebral hemorrhage and was 

evaluated at [facility] and then discharged with precautions.


Allergies:  


Coded Allergies:  


     No Known Drug Allergies (Unverified  Allergy, Unknown, 2/27/25)


Home Meds


Active Scripts


Diclofenac Sodium (Voltaren Arthritis Pain) 1 % Gel..gram., 4 GM TP BID for 10 

Days, #1 TUBE


   Prov:ARNALDO RODRIGUES MD         3/1/25


Ibuprofen (Ibuprofen) 600 Mg Tablet, 600 MG PO Q6H PRN for PAIN, #30 TAB


   Prov:CHANTELLE TSAI NP         2/27/25





Past Medical History


Past Medical History:  Anemia, Migraines, Other


Medical History Other:  HX OF RAYNAUDS DISEASE


Past Surgical History:  Other


Surgical History Other:  TUBAL LIGATION





Female(pregnancy History)


Pregnancy History:  Not Applicable





ROS Dictation


Review of systems is negative for shortness of breath or chest pain or mental 

status changes or visual changes.  It is also negative for nausea or vomiting.  

No changes in urination no changes in bowel movements.  She is able to walk she 

is able to move all of her extremities she does not have lightheadedness or 

dizziness





Physical Exam


General Appearance:  (+) mild distress


Orientation:  (+) oriented x 3


Head/Face Trauma:  No


Eye:  bilateral eye normal inspection, bilateral eye PERRL, bilateral eye EOMI


Neck:  (+) normal inspection, (+) supple, (+) full range of motion, (+) non-

tender


Respiratory:  (+) chest non-tender, (+) lungs clear, (+) well ventilated


Heart:  (+) regular, (+) no gallop


Vascular:  (+) no edema, (+) normal peripheral pulse, (+) no JVD


Gastrointestinal:  (+) soft, (+) non-tender, (+) no organomegaly, (+) bowel 

sound present


Back:  (+) no CVA tenderness


Extremities Comment


Patient has full range of motion to her bilateral hands bilateral elbows 

bilateral shoulders and is nontender to deep palpation in her hands fingers 

forearms and elbows.


Neurologic/Psychiatric:  (+) normal speech


Reflexes:  Normal


Reflexes Comment


Patient has deep tendon reflexes symmetric re symmetrical bilaterally and both 

upper and lower extremities





MDM


Given the patient's past medical history of an intracerebral hemorrhage a week 

ago I will do a CT scan of her head.  I will also do a plain CT scan of her 

abdomen and pelvis mainly to rule out  lumbar spine fractures hip fractures and 

possible intra-abdominal injury.   





Patient's CT scan is of her head is negative, no residual signs of bleeding from

a few weeks ago.





CT of patient's lumbar spine is negative for acute fractures although it does 

seem like she has narrowing of the disc space between L5 and S1 possible 

spondylolisthesis.  This would explain the patient's numbness and tingling that 

radiates down her left leg calf foot and toes.


ED Course





Orders








Procedure Category Date Status





  Time 


 


Ibuprofen 800 Mg Tab PHA 3/26/25 Complete





 (Motrin)  22:30 


 


Ketorolac PHA 3/26/25 Complete





Tromethamine 30mg/Ml  22:30 


 


Ct Abdomen/Pelvis W/O CT 3/26/25 Resulted





Contrast  22:52 


 


Ct Head/Brain W/O CT 3/26/25 Resulted





Contrast  22:52 








Current Medications








 Medications


  (Trade)  Dose


 Ordered  Sig/Greg


 Route


 PRN Reason  Start Time


 Stop Time Status Last Admin


Dose Admin


 


 Ibuprofen


  (moTRIN)  800 mg  ONCE  ONCE


 PO


   3/26/25 22:30


 3/26/25 22:31 DC 3/26/25 23:02





 


 Ketorolac


 Tromethamine


  (toRADol)  30 mg  ONCE  ONCE


 IVP


   3/26/25 22:30


 3/26/25 22:31 DC 3/26/25 23:02











Vital Signs








  Date Time  Temp Pulse Resp B/P (MAP) Pulse Ox O2 Delivery O2 Flow Rate FiO2


 


3/26/25 22:51 98.1 75 15 121/76 97 Room Air* 0 21


 


3/26/25 22:08 97.7 83 20 121/77 98 Room Air  











DX & DISP


Disposition:  Discharge


Departure


Impression:  


   Primary Impression:  Fall


   Additional Impressions:  Radiculopathy due to lumbar intervertebral disc 

   disorder, Sciatica


Condition:  Stable





Additional Instructions:  


FU with primary care MD for sciatica.


Referrals:  


DON MORRIS (PCP)











SYBIL WOO MD           Mar 26, 2025 22:53

## 2025-03-26 NOTE — HMCIMG
CT ABDOMEN/PELVIS W/O CONTRAST



HISTORY:  Status post fall



COMPARISON: None



TECHNIQUE: Multiple sequential axial images of the abdomen and pelvis

were obtained from the dome of the diaphragm through symphysis pubis.

Patient was not given contrast through intravenous route. Oral contrast

was not given.



FINDINGS:  No pleural effusion is seen bilaterally.  There is no

evidence of parenchymal disease or pulmonary nodule of the visualized

lower lungs.  Degenerative changes of the thoracolumbar spine are

present. The heart is not enlarged.



Liver measured 14.4 cm. Postcholecystectomy changes are seen. The liver,

spleen, adrenal glands and pancreas are unremarkable.  There is no

evidence of hydronephrosis bilaterally.  No evidence of renal stone is

seen.  Fecal material is seen in the colon.  There are normal size

retroperitoneal and mesenteric lymph nodes.  No ascites is seen.  No CT

evidence of acute appendicitis is seen.



Pelvic sidewalls are symmetric bilaterally. Bladder is well distended

without wall thickening.



IMPRESSION:

1.  No acute findings.











CT was performed with one or more following dose reduction techniques:

automated exposure control, adjustment of the mA and kv according to

patient's size, or use of a iterative reconstruction technique.

## 2025-03-26 NOTE — HMCIMG
CT HEAD/BRAIN W/O CONTRAST



HISTORY: Status post fall



COMPARISON: None



TECHNIQUE: Multiple sequential axial images of the head were obtained

from the base of the skull through vertex. Patient was not given

contrast through intravenous route. 



FINDINGS: The ventricles and extraventricular CSF spaces are nondilated

for patient's age.  There is no midline shift, mass effect or

herniation.  No acute intracranial bleed is seen.  Visualized portion of

the paranasal sinuses are grossly within normal limits.



IMPRESSION: 

1. No acute intracranial bleed is seen.









CT was performed with one or more following dose reduction techniques:

automated exposure control, adjustment of the mA and kv according to

patient's size, or use of a iterative reconstruction technique.

## 2025-03-26 NOTE — NUR
PT CARE ASSUMED AT THIS TIME DISPOSITION/PHYSICAL EXAM/PAST MEDICAL/SURGICAL/SOCIAL HISTORY/VITAL SIGNS( Pullset)/REVIEW OF SYSTEMS/HISTORY OF PRESENT ILLNESS

## 2025-03-27 VITALS
DIASTOLIC BLOOD PRESSURE: 82 MMHG | OXYGEN SATURATION: 97 % | TEMPERATURE: 98.1 F | SYSTOLIC BLOOD PRESSURE: 108 MMHG | RESPIRATION RATE: 17 BRPM | HEART RATE: 75 BPM

## 2025-03-27 RX ADMIN — SODIUM CHLORIDE ONE MLS/HR: 0.9 INJECTION, SOLUTION INTRAVENOUS at 01:51
